# Patient Record
Sex: FEMALE | Race: WHITE | NOT HISPANIC OR LATINO | URBAN - METROPOLITAN AREA
[De-identification: names, ages, dates, MRNs, and addresses within clinical notes are randomized per-mention and may not be internally consistent; named-entity substitution may affect disease eponyms.]

---

## 2018-09-18 PROBLEM — 238131007 OVERWEIGHT: Status: ACTIVE | Noted: 2018-09-18

## 2022-04-30 ENCOUNTER — TELEPHONE ENCOUNTER (OUTPATIENT)
Dept: URBAN - METROPOLITAN AREA CLINIC 121 | Facility: CLINIC | Age: 57
End: 2022-04-30

## 2022-05-01 ENCOUNTER — TELEPHONE ENCOUNTER (OUTPATIENT)
Dept: URBAN - METROPOLITAN AREA CLINIC 121 | Facility: CLINIC | Age: 57
End: 2022-05-01

## 2022-05-01 RX ORDER — MULTIVITAMIN
TABLET ORAL
Status: ACTIVE | COMMUNITY
Start: 2018-11-06

## 2023-09-12 ENCOUNTER — DASHBOARD ENCOUNTERS (OUTPATIENT)
Age: 58
End: 2023-09-12

## 2023-09-12 ENCOUNTER — WEB ENCOUNTER (OUTPATIENT)
Dept: URBAN - METROPOLITAN AREA CLINIC 27 | Facility: CLINIC | Age: 58
End: 2023-09-12

## 2023-09-12 ENCOUNTER — OFFICE VISIT (OUTPATIENT)
Dept: URBAN - METROPOLITAN AREA CLINIC 27 | Facility: CLINIC | Age: 58
End: 2023-09-12
Payer: COMMERCIAL

## 2023-09-12 VITALS
HEIGHT: 66 IN | RESPIRATION RATE: 17 BRPM | BODY MASS INDEX: 25.71 KG/M2 | SYSTOLIC BLOOD PRESSURE: 135 MMHG | WEIGHT: 160 LBS | HEART RATE: 96 BPM | DIASTOLIC BLOOD PRESSURE: 84 MMHG

## 2023-09-12 DIAGNOSIS — Z12.11 COLON CANCER SCREENING: ICD-10-CM

## 2023-09-12 DIAGNOSIS — K64.9 ACUTE HEMORRHOID: ICD-10-CM

## 2023-09-12 DIAGNOSIS — Z80.0 FAMILY HISTORY OF MALIGNANT NEOPLASM OF DIGESTIVE ORGANS: ICD-10-CM

## 2023-09-12 PROBLEM — 70153002: Status: ACTIVE | Noted: 2023-09-12

## 2023-09-12 PROBLEM — 305058001: Status: ACTIVE | Noted: 2023-09-12

## 2023-09-12 PROCEDURE — 99204 OFFICE O/P NEW MOD 45 MIN: CPT | Performed by: INTERNAL MEDICINE

## 2023-09-12 RX ORDER — MULTIVITAMIN
TABLET ORAL
Status: ACTIVE | COMMUNITY
Start: 2018-11-06

## 2023-09-12 NOTE — HPI-ZZZTODAY'S VISIT
Ms. Fernandez is a 58-year-old female patient of Dr. Phillip presenting for screening colonoscopy. Her brother was just diagnosed with colon cancer last week while he was hospitalized for open heart surgery. She has interimttent trace red blood on the TP which she attributes to hemorrhoids. She underwent hemorrhoidectomy years ago with Dr. Mccullough but hemorrhoids have since returned. She uses topical therapy intermittently, does admit to straining with her stools and sitting on the toilet for long periods. No recent change in bowel habits. She uses Metamucil prn. . Colonoscopy 2018: Small internal hemorrhoids; recall 5 years with [facility] CRC  . FH: father with CRC at age 64; brother just diagnosed with CRC last week at age 63

## 2023-10-30 ENCOUNTER — OUT OF OFFICE VISIT (OUTPATIENT)
Dept: URBAN - METROPOLITAN AREA SURGERY CENTER 7 | Facility: SURGERY CENTER | Age: 58
End: 2023-10-30
Payer: COMMERCIAL

## 2023-10-30 ENCOUNTER — CLAIMS CREATED FROM THE CLAIM WINDOW (OUTPATIENT)
Dept: URBAN - METROPOLITAN AREA CLINIC 4 | Facility: CLINIC | Age: 58
End: 2023-10-30
Payer: COMMERCIAL

## 2023-10-30 DIAGNOSIS — Z80.0 BROTHER AT YOUNG AGE FAMILY HISTORY OF COLON CANCER: ICD-10-CM

## 2023-10-30 DIAGNOSIS — K64.2 GRADE III INTERNAL HEMORRHOIDS: ICD-10-CM

## 2023-10-30 DIAGNOSIS — D12.2 BENIGN NEOPLASM OF ASCENDING COLON: ICD-10-CM

## 2023-10-30 DIAGNOSIS — D12.2 ADENOMA OF ASCENDING COLON: ICD-10-CM

## 2023-10-30 DIAGNOSIS — D12.2 ADENOMATOUS POLYP OF ASCENDING COLON: ICD-10-CM

## 2023-10-30 DIAGNOSIS — Z80.0 FAMILY HISTORY OF COLON CANCER: ICD-10-CM

## 2023-10-30 DIAGNOSIS — Z12.11 COLON CANCER SCREENING: ICD-10-CM

## 2023-10-30 DIAGNOSIS — K57.30 DIVERTICULOSIS OF SIGMOID COLON: ICD-10-CM

## 2023-10-30 PROCEDURE — 45385 COLONOSCOPY W/LESION REMOVAL: CPT | Performed by: INTERNAL MEDICINE

## 2023-10-30 PROCEDURE — 00811 ANES LWR INTST NDSC NOS: CPT | Performed by: NURSE ANESTHETIST, CERTIFIED REGISTERED

## 2023-10-30 PROCEDURE — 88305 TISSUE EXAM BY PATHOLOGIST: CPT | Performed by: PATHOLOGY

## 2023-10-30 PROCEDURE — G8907 PT DOC NO EVENTS ON DISCHARG: HCPCS | Performed by: INTERNAL MEDICINE

## 2023-10-30 RX ORDER — MULTIVITAMIN
TABLET ORAL
Status: ACTIVE | COMMUNITY
Start: 2018-11-06

## 2024-04-17 ENCOUNTER — HEM (OUTPATIENT)
Dept: URBAN - METROPOLITAN AREA CLINIC 27 | Facility: CLINIC | Age: 59
End: 2024-04-17
Payer: COMMERCIAL

## 2024-04-17 VITALS
SYSTOLIC BLOOD PRESSURE: 155 MMHG | HEART RATE: 96 BPM | HEIGHT: 66 IN | WEIGHT: 164 LBS | BODY MASS INDEX: 26.36 KG/M2 | DIASTOLIC BLOOD PRESSURE: 105 MMHG

## 2024-04-17 DIAGNOSIS — K64.3 GRADE IV HEMORRHOIDS: ICD-10-CM

## 2024-04-17 PROCEDURE — 46221 LIGATION OF HEMORRHOID(S): CPT | Performed by: INTERNAL MEDICINE

## 2024-04-17 PROCEDURE — 99204 OFFICE O/P NEW MOD 45 MIN: CPT | Performed by: INTERNAL MEDICINE

## 2024-04-17 RX ORDER — MULTIVITAMIN
TABLET ORAL
Status: ACTIVE | COMMUNITY
Start: 2018-11-06

## 2024-04-17 NOTE — HPI-TODAY'S VISIT:
Mrs. Aleman is a 59-year-old  female who presents today for her initial hemorrhoid banding.th she reports having issues with hemorrhoids such as bleeding and discomfort and her last colonoscopy was performed by Dr. Phillip in October 2023.  Otherwise, she has no other GI complaints.